# Patient Record
Sex: FEMALE | Race: BLACK OR AFRICAN AMERICAN
[De-identification: names, ages, dates, MRNs, and addresses within clinical notes are randomized per-mention and may not be internally consistent; named-entity substitution may affect disease eponyms.]

---

## 2018-01-25 NOTE — PDOC
Attending Attestation





- HPI


HPI: 





01/25/18 09:58


The patient is a 52 year old female with a significant past medical history of 

diabetes (on metformin) who presents to the ED with 2 days of cough, sore throat

, nasal congestion and generalized body aches. Patient also reports generalized 

weakness, loss of appetite and chills associated with present symptoms. Denies 

fever. Denies abdominal pain, nausea, vomiting, or diarrhea. 


Patient did not receive flu shot this year. 














- Physicial Exam


PE: 





01/25/18 09:58


Vitals: Triage Vital signs reviewed


General Appearance:  no acute distress, well nourished well developed,


Head: Atraumatic, normocephalic


Cardiac: Regular rate and rhythm, no murmurs, no rubs, no gallops,


Lungs: Clear to auscultation bilateral, good air movement bilaterally,


Extremities: Full range of motion to all extremities, no cyanosis, clubbing, or 

edema


Skin:  Warm and dry, no rashes or lesions, no petechiae


Psych:  normal mood, normal affect








<Nuvia Robertson - Last Filed: 01/25/18 09:58>





- Resident


Resident Name: Manuel Ozuna





- ED Attending Attestation


I have performed the following: I have examined & evaluated the patient, The 

case was reviewed & discussed with the resident, I agree w/resident's findings 

& plan, Exceptions are as noted





- Medical Decision Making





01/25/18 15:21





History and examination consistent with influenza-like illness. Patient 

hemodynamically stable well-appearing no apparent distress. No indication for 

Tamiflu at this time given that symptoms started greater than 48 hours ago. 

Patient was counseled at length regarding smoking cessation





She'll follow-up with her doctor this week





Findings, the need for follow-up, strict return instructions discussed with 

patient.








<Jim Peralta - Last Filed: 01/25/18 15:22>

## 2018-01-25 NOTE — PDOC
History of Present Illness





- General


Chief Complaint: Cold Symptoms


Stated Complaint: R/O FLU


Time Seen by Provider: 01/25/18 07:59


History Source: Patient


Exam Limitations: No Limitations





- History of Present Illness


Initial Comments: 





01/25/18 08:29


52F with h/o Dm2 on metformin presents cough, sore throat, sinus congestion and 

body aches since Tuesday morning.


No sick contacts at home possibly at work. 


Feels very weak, loss of appetite recently. Admits to chills. 


Did not receive flu shot this year. 


01/25/18 08:46





01/25/18 08:47





01/25/18 09:42








Past History





- Past Medical History


Allergies/Adverse Reactions: 


 Allergies











Allergy/AdvReac Type Severity Reaction Status Date / Time


 


No Known Drug Allergies Allergy   Verified 01/25/18 07:41











Home Medications: 


Ambulatory Orders





FA/Mv,Ca,Iron,Min/Lycopene/Lut [Centrum Tablet] 1 each PO DAILY 08/20/15 








COPD: No


Diabetes: Yes (BORDERLINE)


HTN: Yes ("NO MEDS")





- Immunization History


Immunization Up to Date: Yes





- Suicide/Smoking/Psychosocial Hx


Smoking Status: Yes


Smoking History: Current every day smoker


Have you smoked in the past 12 months: Yes


Number of Cigarettes Smoked Daily: 10


Information on smoking cessation initiated: No


'Breaking Loose' booklet given: 08/21/15


Hx Alcohol Use: No


Drug/Substance Use Hx: No


Substance Use Type: Alcohol, Marijuana


Hx Substance Use Treatment: No





**Review of Systems





- Review of Systems


Able to Perform ROS?: Yes


Constitutional: Yes: Chills, Fever, Night Sweats, Weakness


HEENTM: Yes: Throat Pain


Respiratory: Yes: Cough


Cardiac (ROS): No: Symptoms Reported


ABD/GI: No: Symptoms Reported


: No: Symptoms Reported


Musculoskeletal: Yes: Symptoms Reported, Muscle Pain


Integumentary: No: Symptoms Reported


Neurological: No: Symptoms reported


All Other Systems: Reviewed and Negative





*Physical Exam





- Vital Signs


 Last Vital Signs











Temp Pulse Resp BP Pulse Ox


 


 99.5 F   98 H  16   166/87   98 


 


 01/25/18 07:42  01/25/18 07:42  01/25/18 07:42  01/25/18 07:42  01/25/18 07:42














- Physical Exam


General Appearance: Yes: Nourished, Appropriately Dressed.  No: Apparent 

Distress


HEENT: positive: EOMI, COLLINS.  negative: Tonsillar Exudate


Neck: positive: Lymphadenopathy (R), Lymphadenopathy (L)


Respiratory/Chest: positive: Lungs Clear, Normal Breath Sounds.  negative: 

Chest Tender, Respiratory Distress


Cardiovascular: positive: Regular Rhythm, S1, S2, Tachycardia


Gastrointestinal/Abdominal: positive: Normal Bowel Sounds, Flat, Soft.  negative

: Tender


Musculoskeletal: positive: Normal Inspection


Extremity: positive: Normal Capillary Refill, Normal Inspection, Normal Range 

of Motion


Integumentary: positive: Normal Color, Dry, Warm


Neurologic: positive: Fully Oriented, Alert, Normal Mood/Affect, Normal Response

, Motor Strength 5/5





ED Treatment Course





- LABORATORY


CBC & Chemistry Diagram: 


 01/25/18 08:34





 01/25/18 08:34





- RADIOLOGY


Radiology Studies Ordered: 














 Category Date Time Status


 


 CHEST PA & LAT [RAD] Stat Radiology  01/25/18 08:28 Ordered














Medical Decision Making





- Medical Decision Making





01/25/18 08:49


52F with pmh of dm2 presenting with cold symptoms and body chills. 


flu and strep swab., basic labs. 





01/25/18 09:50


All labs negative, flu and strep neg. 


Xray chest neg. 


Will give motrin and D/C





*DC/Admit/Observation/Transfer


Diagnosis at time of Disposition: 


 Viral sinusitis








- Discharge Dispostion


Disposition: HOME


Condition at time of disposition: Improved


Admit: No





- Referrals


Referrals: 


Alli Hussein MD [Primary Care Provider] - 





- Patient Instructions


Printed Discharge Instructions:  How to Avoid a Cold or Flu, DI for Common Cold


Additional Instructions: 


Come back to the ER for any new, worsening or concerning symptoms. 


Follow up with your primary physician within the next 4-5 days. 





- Post Discharge Activity


Forms/Work/School Notes:  Back to Work

## 2020-11-10 ENCOUNTER — HOSPITAL ENCOUNTER (EMERGENCY)
Dept: HOSPITAL 74 - JER | Age: 55
Discharge: HOME | End: 2020-11-10
Payer: COMMERCIAL

## 2020-11-10 VITALS — DIASTOLIC BLOOD PRESSURE: 88 MMHG | TEMPERATURE: 97.9 F | SYSTOLIC BLOOD PRESSURE: 168 MMHG | HEART RATE: 87 BPM

## 2020-11-10 VITALS — BODY MASS INDEX: 24 KG/M2

## 2020-11-10 DIAGNOSIS — J06.9: Primary | ICD-10-CM

## 2020-11-10 PROCEDURE — C9803 HOPD COVID-19 SPEC COLLECT: HCPCS

## 2020-11-10 PROCEDURE — U0003 INFECTIOUS AGENT DETECTION BY NUCLEIC ACID (DNA OR RNA); SEVERE ACUTE RESPIRATORY SYNDROME CORONAVIRUS 2 (SARS-COV-2) (CORONAVIRUS DISEASE [COVID-19]), AMPLIFIED PROBE TECHNIQUE, MAKING USE OF HIGH THROUGHPUT TECHNOLOGIES AS DESCRIBED BY CMS-2020-01-R: HCPCS

## 2020-12-31 ENCOUNTER — HOSPITAL ENCOUNTER (EMERGENCY)
Dept: HOSPITAL 74 - JER | Age: 55
Discharge: HOME | End: 2020-12-31
Payer: COMMERCIAL

## 2020-12-31 VITALS — SYSTOLIC BLOOD PRESSURE: 123 MMHG | HEART RATE: 93 BPM | DIASTOLIC BLOOD PRESSURE: 86 MMHG

## 2020-12-31 VITALS — TEMPERATURE: 97.2 F

## 2020-12-31 VITALS — BODY MASS INDEX: 25.1 KG/M2

## 2020-12-31 DIAGNOSIS — R00.2: Primary | ICD-10-CM

## 2020-12-31 LAB
ALBUMIN SERPL-MCNC: 3.9 G/DL (ref 3.4–5)
ALP SERPL-CCNC: 81 U/L (ref 45–117)
ALT SERPL-CCNC: 23 U/L (ref 13–61)
ANION GAP SERPL CALC-SCNC: 13 MMOL/L (ref 8–16)
APTT BLD: 27.5 SECONDS (ref 25.2–36.5)
AST SERPL-CCNC: 12 U/L (ref 15–37)
BASOPHILS # BLD: 0.7 % (ref 0–2)
BILIRUB SERPL-MCNC: 0.6 MG/DL (ref 0.2–1)
BUN SERPL-MCNC: 9.1 MG/DL (ref 7–18)
CALCIUM SERPL-MCNC: 9.6 MG/DL (ref 8.5–10.1)
CHLORIDE SERPL-SCNC: 102 MMOL/L (ref 98–107)
CO2 SERPL-SCNC: 25 MMOL/L (ref 21–32)
CREAT SERPL-MCNC: 0.8 MG/DL (ref 0.55–1.3)
DEPRECATED RDW RBC AUTO: 15.2 % (ref 11.6–15.6)
EOSINOPHIL # BLD: 0.6 % (ref 0–4.5)
GLUCOSE SERPL-MCNC: 142 MG/DL (ref 74–106)
HCT VFR BLD CALC: 45.9 % (ref 32.4–45.2)
HGB BLD-MCNC: 15.3 GM/DL (ref 10.7–15.3)
INR BLD: 1 (ref 0.83–1.09)
LYMPHOCYTES # BLD: 17.5 % (ref 8–40)
MCH RBC QN AUTO: 29 PG (ref 25.7–33.7)
MCHC RBC AUTO-ENTMCNC: 33.3 G/DL (ref 32–36)
MCV RBC: 87.1 FL (ref 80–96)
MONOCYTES # BLD AUTO: 13.6 % (ref 3.8–10.2)
NEUTROPHILS # BLD: 67.6 % (ref 42.8–82.8)
PLATELET # BLD AUTO: 280 K/MM3 (ref 134–434)
PMV BLD: 9.1 FL (ref 7.5–11.1)
POTASSIUM SERPLBLD-SCNC: 4 MMOL/L (ref 3.5–5.1)
PROT SERPL-MCNC: 7.7 G/DL (ref 6.4–8.2)
PT PNL PPP: 12.3 SEC (ref 9.7–13)
RBC # BLD AUTO: 5.28 M/MM3 (ref 3.6–5.2)
SODIUM SERPL-SCNC: 139 MMOL/L (ref 136–145)
WBC # BLD AUTO: 7.3 K/MM3 (ref 4–10)

## 2021-12-31 ENCOUNTER — HOSPITAL ENCOUNTER (EMERGENCY)
Dept: HOSPITAL 74 - JER | Age: 56
Discharge: HOME | End: 2021-12-31
Payer: COMMERCIAL

## 2021-12-31 VITALS — DIASTOLIC BLOOD PRESSURE: 84 MMHG | TEMPERATURE: 97.9 F | HEART RATE: 96 BPM | SYSTOLIC BLOOD PRESSURE: 134 MMHG

## 2021-12-31 VITALS — BODY MASS INDEX: 23.7 KG/M2

## 2021-12-31 DIAGNOSIS — J06.9: Primary | ICD-10-CM

## 2021-12-31 PROCEDURE — C9803 HOPD COVID-19 SPEC COLLECT: HCPCS

## 2021-12-31 PROCEDURE — U0005 INFEC AGEN DETEC AMPLI PROBE: HCPCS

## 2021-12-31 PROCEDURE — U0003 INFECTIOUS AGENT DETECTION BY NUCLEIC ACID (DNA OR RNA); SEVERE ACUTE RESPIRATORY SYNDROME CORONAVIRUS 2 (SARS-COV-2) (CORONAVIRUS DISEASE [COVID-19]), AMPLIFIED PROBE TECHNIQUE, MAKING USE OF HIGH THROUGHPUT TECHNOLOGIES AS DESCRIBED BY CMS-2020-01-R: HCPCS
